# Patient Record
Sex: MALE
[De-identification: names, ages, dates, MRNs, and addresses within clinical notes are randomized per-mention and may not be internally consistent; named-entity substitution may affect disease eponyms.]

---

## 2024-04-08 ENCOUNTER — APPOINTMENT (OUTPATIENT)
Dept: DERMATOLOGY | Facility: CLINIC | Age: 29
End: 2024-04-08
Payer: COMMERCIAL

## 2024-04-08 DIAGNOSIS — L85.3 XEROSIS CUTIS: ICD-10-CM

## 2024-04-08 DIAGNOSIS — L20.9 ATOPIC DERMATITIS, UNSPECIFIED: ICD-10-CM

## 2024-04-08 PROBLEM — Z00.00 ENCOUNTER FOR PREVENTIVE HEALTH EXAMINATION: Status: ACTIVE | Noted: 2024-04-08

## 2024-04-08 PROCEDURE — 99204 OFFICE O/P NEW MOD 45 MIN: CPT

## 2024-04-08 NOTE — ASSESSMENT
[FreeTextEntry1] :  # Atopic dermatitis, moderate, chronic condition, flaring, and # Xerosis - BSA 5-6%, face, neck, extremities, hands - Discussed the nature and course of this condition, in addition to treatment options, expectations from treatments, and associated side effects of topical therapies --Has topical steroids at home but prefers not to use as do not help - Start Dupixent 600 mg SC (2 injections of 300 mg each) injected as loading dose. Continue with 300 mg injections every 2 weeks as maintenance. SED. Risks of medication reviewed including but not limited to: immunosuppression, increased risk of injection site reaction, hypersensitivity reaction, conjunctivitis, keratitis, cold sores. Pt advised to avoid live vaccines while on this medication. - Pt to inform us if eye dryness worsens or if develops any additional ocular symptoms  - Dry skin care reviewed- shower no more than once daily for 5-10 minutes with lukewarm water, gentle soaps, products free of fragrances  RTC 4 months

## 2024-04-08 NOTE — HISTORY OF PRESENT ILLNESS
[FreeTextEntry1] : NP eczema [de-identified] : NP eczema Present for many years Very itchy Has tried numerous topical steroids without adequate control Was put on Dupixent for a few months in late 2023, did very well but was unable to follow up Would like to go back on Dupixent  Had some eye dryness while on Dupi, still occasionally has, but mild and infrequent, lasts no more than 30 min

## 2024-04-09 RX ORDER — DUPILUMAB 300 MG/2ML
300 INJECTION, SOLUTION SUBCUTANEOUS
Qty: 1 | Refills: 0 | Status: ACTIVE | COMMUNITY
Start: 2024-04-08 | End: 1900-01-01

## 2024-04-09 RX ORDER — DUPILUMAB 300 MG/2ML
300 INJECTION, SOLUTION SUBCUTANEOUS
Qty: 1 | Refills: 4 | Status: ACTIVE | COMMUNITY
Start: 2024-04-08 | End: 1900-01-01

## 2024-04-11 ENCOUNTER — OUTPATIENT (OUTPATIENT)
Dept: OUTPATIENT SERVICES | Facility: HOSPITAL | Age: 29
LOS: 1 days | End: 2024-04-11

## 2024-04-11 ENCOUNTER — APPOINTMENT (OUTPATIENT)
Dept: PRIMARY CARE | Facility: CLINIC | Age: 29
End: 2024-04-11

## 2024-04-11 ENCOUNTER — NON-APPOINTMENT (OUTPATIENT)
Age: 29
End: 2024-04-11

## 2024-04-22 ENCOUNTER — NON-APPOINTMENT (OUTPATIENT)
Age: 29
End: 2024-04-22

## 2024-04-22 DIAGNOSIS — H57.89 OTHER SPECIFIED DISORDERS OF EYE AND ADNEXA: ICD-10-CM

## 2024-09-13 ENCOUNTER — APPOINTMENT (OUTPATIENT)
Dept: DERMATOLOGY | Facility: CLINIC | Age: 29
End: 2024-09-13
Payer: COMMERCIAL

## 2024-09-13 VITALS — HEIGHT: 72 IN | BODY MASS INDEX: 25.06 KG/M2 | WEIGHT: 185 LBS

## 2024-09-13 DIAGNOSIS — L20.9 ATOPIC DERMATITIS, UNSPECIFIED: ICD-10-CM

## 2024-09-13 DIAGNOSIS — L85.3 XEROSIS CUTIS: ICD-10-CM

## 2024-09-13 PROCEDURE — 99214 OFFICE O/P EST MOD 30 MIN: CPT

## 2024-09-13 NOTE — ASSESSMENT
[FreeTextEntry1] :  # Atopic dermatitis, moderate, chronic condition, flaring, and # Xerosis - Flaring again now that off Dupixent for a month - Discussed the nature and course of this condition, in addition to treatment options, expectations from treatments, and associated side effects of topical therapies --Has topical steroids at home but prefers not to use as do not help, discussed using until Dupixent kicks in. Appropriate use discussed. - Re-start Dupixent with 300 mg injections every 2 weeks as maintenance. No need to re-load. SED. Risks of medication reviewed including but not limited to: immunosuppression, increased risk of injection site reaction, hypersensitivity reaction, conjunctivitis, keratitis, cold sores. Pt advised to avoid live vaccines while on this medication. - Pt to inform us if eye dryness worsens or if develops any additional ocular symptoms  - Dry skin care reviewed- shower no more than once daily for 5-10 minutes with lukewarm water, gentle soaps, products free of fragrances  RTC 6 months

## 2024-09-13 NOTE — HISTORY OF PRESENT ILLNESS
[FreeTextEntry1] : RP eczema [de-identified] : RP eczema Present for many years Has tried numerous topical steroids without adequate control Was put on Dupixent for a few months in late 2023, did very well but was unable to follow up Again re-started back in April but then lost insurance, now off of it for one month and has started to flare again Would like to go back on Dupixent  Had some eye dryness while on Dupi first time around, most recently only had one mild episode

## 2025-03-14 ENCOUNTER — APPOINTMENT (OUTPATIENT)
Dept: DERMATOLOGY | Facility: CLINIC | Age: 30
End: 2025-03-14
Payer: COMMERCIAL

## 2025-03-14 ENCOUNTER — NON-APPOINTMENT (OUTPATIENT)
Age: 30
End: 2025-03-14

## 2025-03-14 DIAGNOSIS — H57.89 OTHER SPECIFIED DISORDERS OF EYE AND ADNEXA: ICD-10-CM

## 2025-03-14 DIAGNOSIS — L20.9 ATOPIC DERMATITIS, UNSPECIFIED: ICD-10-CM

## 2025-03-14 PROCEDURE — 99214 OFFICE O/P EST MOD 30 MIN: CPT

## 2025-03-14 RX ORDER — DUPILUMAB 300 MG/2ML
300 INJECTION, SOLUTION SUBCUTANEOUS
Qty: 1 | Refills: 6 | Status: ACTIVE | COMMUNITY
Start: 2025-03-14 | End: 1900-01-01

## 2025-03-21 ENCOUNTER — APPOINTMENT (OUTPATIENT)
Dept: OPHTHALMOLOGY | Facility: CLINIC | Age: 30
End: 2025-03-21

## 2025-06-13 ENCOUNTER — APPOINTMENT (OUTPATIENT)
Dept: OPHTHALMOLOGY | Facility: CLINIC | Age: 30
End: 2025-06-13

## 2025-06-13 ENCOUNTER — APPOINTMENT (OUTPATIENT)
Dept: OPHTHALMOLOGY | Facility: CLINIC | Age: 30
End: 2025-06-13
Payer: COMMERCIAL

## 2025-06-13 ENCOUNTER — NON-APPOINTMENT (OUTPATIENT)
Age: 30
End: 2025-06-13

## 2025-06-13 PROCEDURE — 92004 COMPRE OPH EXAM NEW PT 1/>: CPT

## 2025-06-13 PROCEDURE — 92134 CPTRZ OPH DX IMG PST SGM RTA: CPT
